# Patient Record
Sex: FEMALE | Race: WHITE | NOT HISPANIC OR LATINO | Employment: FULL TIME | URBAN - METROPOLITAN AREA
[De-identification: names, ages, dates, MRNs, and addresses within clinical notes are randomized per-mention and may not be internally consistent; named-entity substitution may affect disease eponyms.]

---

## 2022-09-07 PROBLEM — N87.0 DYSPLASIA OF CERVIX, LOW GRADE (CIN 1): Status: RESOLVED | Noted: 2022-09-07 | Resolved: 2022-09-07

## 2022-09-07 PROBLEM — R87.810 CERVICAL HIGH RISK HUMAN PAPILLOMAVIRUS (HPV) DNA TEST POSITIVE: Status: ACTIVE | Noted: 2022-09-07

## 2022-09-07 PROBLEM — N87.0 DYSPLASIA OF CERVIX, LOW GRADE (CIN 1): Status: ACTIVE | Noted: 2022-09-07

## 2022-11-16 PROBLEM — N87.1 MODERATE DYSPLASIA OF CERVIX: Chronic | Status: ACTIVE | Noted: 2022-11-16

## 2022-11-16 PROBLEM — D25.2 FIBROIDS, SUBSEROUS: Chronic | Status: ACTIVE | Noted: 2022-11-16

## 2022-11-16 PROBLEM — N87.1 MODERATE DYSPLASIA OF CERVIX: Chronic | Status: RESOLVED | Noted: 2022-11-16 | Resolved: 2022-11-16

## 2022-11-16 PROBLEM — D25.2 FIBROIDS, SUBSEROUS: Chronic | Status: RESOLVED | Noted: 2022-11-16 | Resolved: 2022-11-16

## 2022-11-16 PROBLEM — N92.1 MENOMETRORRHAGIA: Chronic | Status: RESOLVED | Noted: 2022-11-16 | Resolved: 2022-11-16

## 2022-11-16 PROBLEM — Z98.890 HISTORY OF LOOP ELECTRICAL EXCISION PROCEDURE (LEEP): Chronic | Status: ACTIVE | Noted: 2022-11-16

## 2022-11-16 PROBLEM — N92.1 MENOMETRORRHAGIA: Chronic | Status: ACTIVE | Noted: 2022-11-16

## 2023-05-16 ENCOUNTER — OFFICE VISIT (OUTPATIENT)
Dept: URGENT CARE | Facility: CLINIC | Age: 33
End: 2023-05-16
Payer: COMMERCIAL

## 2023-05-16 VITALS
HEART RATE: 79 BPM | TEMPERATURE: 97 F | DIASTOLIC BLOOD PRESSURE: 89 MMHG | WEIGHT: 215 LBS | OXYGEN SATURATION: 98 % | BODY MASS INDEX: 33.74 KG/M2 | SYSTOLIC BLOOD PRESSURE: 141 MMHG | HEIGHT: 67 IN | RESPIRATION RATE: 18 BRPM

## 2023-05-16 DIAGNOSIS — J01.90 ACUTE BACTERIAL RHINOSINUSITIS: Primary | ICD-10-CM

## 2023-05-16 DIAGNOSIS — B96.89 ACUTE BACTERIAL RHINOSINUSITIS: Primary | ICD-10-CM

## 2023-05-16 PROCEDURE — 99203 PR OFFICE/OUTPT VISIT, NEW, LEVL III, 30-44 MIN: ICD-10-PCS | Mod: S$GLB,,,

## 2023-05-16 PROCEDURE — 99203 OFFICE O/P NEW LOW 30 MIN: CPT | Mod: S$GLB,,,

## 2023-05-16 RX ORDER — AMOXICILLIN AND CLAVULANATE POTASSIUM 875; 125 MG/1; MG/1
1 TABLET, FILM COATED ORAL EVERY 12 HOURS
Qty: 14 TABLET | Refills: 0 | Status: SHIPPED | OUTPATIENT
Start: 2023-05-16 | End: 2023-05-23

## 2023-05-16 RX ORDER — GUAIFENESIN 600 MG/1
1200 TABLET, EXTENDED RELEASE ORAL 2 TIMES DAILY
Qty: 40 TABLET | Refills: 0 | Status: SHIPPED | OUTPATIENT
Start: 2023-05-16 | End: 2023-05-26

## 2023-05-16 RX ORDER — PROMETHAZINE HYDROCHLORIDE AND DEXTROMETHORPHAN HYDROBROMIDE 6.25; 15 MG/5ML; MG/5ML
5 SYRUP ORAL EVERY 4 HOURS PRN
Qty: 118 ML | Refills: 0 | Status: SHIPPED | OUTPATIENT
Start: 2023-05-16 | End: 2023-05-23

## 2023-05-16 RX ORDER — TRIAMCINOLONE ACETONIDE 55 UG/1
2 SPRAY, METERED NASAL DAILY
Qty: 6.7 ML | Refills: 0 | Status: SHIPPED | OUTPATIENT
Start: 2023-05-16

## 2023-05-16 NOTE — LETTER
May 16, 2023      [facility]  [address], [address]  [place] [address]  Phone: [phone]  Fax: [phone]       Patient: Iliana Velásquez   YOB: 1990  Date of Visit: 05/16/2023    To Whom It May Concern:    Madiha Velásquez  was at [facility] on 05/16/2023. The patient may return to work/school on 5/17/23 with no restrictions. If you have any questions or concerns, or if I can be of further assistance, please do not hesitate to contact me.    Sincerely,    Maribel Sheets PA-C

## 2023-05-16 NOTE — PROGRESS NOTES
"Subjective:      Patient ID: Iliana Velásquez is a 32 y.o. female.    Vitals:  height is 5' 7" (1.702 m) and weight is 97.5 kg (215 lb). Her oral temperature is 97.1 °F (36.2 °C). Her blood pressure is 141/89 (abnormal) and her pulse is 79. Her respiration is 18 and oxygen saturation is 98%.     Chief Complaint: Cough    C/o coughing, sore throat, nasal congestion x 1 week. She is taking claritin and mucinex with no relief.     Cough  This is a new problem. The current episode started 1 to 4 weeks ago (1 week). The problem has been unchanged. The problem occurs constantly. The cough is Productive of sputum. Associated symptoms include headaches, nasal congestion, postnasal drip and a sore throat. Pertinent negatives include no ear congestion, ear pain, fever, rash or shortness of breath. The symptoms are aggravated by lying down. She has tried OTC cough suppressant (claritin, mucinex) for the symptoms. The treatment provided no relief. Her past medical history is significant for asthma. sports asthma     Constitution: Negative for fever.   HENT:  Positive for congestion, postnasal drip, sinus pressure and sore throat. Negative for ear pain.    Respiratory:  Positive for cough. Negative for shortness of breath.    Gastrointestinal:  Negative for vomiting and diarrhea.   Skin:  Negative for rash.   Neurological:  Positive for headaches.    Objective:     Physical Exam   Constitutional: She is oriented to person, place, and time. She appears well-developed. She is cooperative.  Non-toxic appearance. She does not appear ill. No distress.   HENT:   Head: Normocephalic and atraumatic.   Ears:   Right Ear: Hearing, external ear and ear canal normal. Tympanic membrane is retracted. A middle ear effusion is present.   Left Ear: Hearing, external ear and ear canal normal. A middle ear effusion is present.   Nose: Mucosal edema and congestion present. No rhinorrhea or nasal deformity. No epistaxis. Right sinus exhibits " maxillary sinus tenderness and frontal sinus tenderness. Left sinus exhibits maxillary sinus tenderness and frontal sinus tenderness.   Mouth/Throat: Uvula is midline and mucous membranes are normal. Mucous membranes are moist. No trismus in the jaw. Normal dentition. No uvula swelling. Posterior oropharyngeal erythema present. No oropharyngeal exudate or posterior oropharyngeal edema.   Eyes: Conjunctivae and lids are normal. No scleral icterus.   Neck: Trachea normal and phonation normal. Neck supple. No edema present. No erythema present. No neck rigidity present.   Cardiovascular: Normal rate, regular rhythm, normal heart sounds and normal pulses.   Pulmonary/Chest: Effort normal and breath sounds normal. No respiratory distress. She has no decreased breath sounds. She has no wheezes. She has no rhonchi.   Abdominal: Normal appearance.   Musculoskeletal: Normal range of motion.         General: No deformity. Normal range of motion.   Neurological: She is alert and oriented to person, place, and time. She exhibits normal muscle tone. Coordination normal.   Skin: Skin is warm, dry, intact, not diaphoretic and not pale.   Psychiatric: Her speech is normal and behavior is normal. Judgment and thought content normal.   Nursing note and vitals reviewed.    Assessment:     1. Acute bacterial rhinosinusitis        Plan:       Acute bacterial rhinosinusitis  -     amoxicillin-clavulanate 875-125mg (AUGMENTIN) 875-125 mg per tablet; Take 1 tablet by mouth every 12 (twelve) hours. for 7 days  Dispense: 14 tablet; Refill: 0  -     triamcinolone (NASACORT) 55 mcg nasal inhaler; 2 sprays by Nasal route once daily.  Dispense: 6.7 mL; Refill: 0  -     promethazine-dextromethorphan (PROMETHAZINE-DM) 6.25-15 mg/5 mL Syrp; Take 5 mLs by mouth every 4 (four) hours as needed (cough).  Dispense: 118 mL; Refill: 0  -     guaiFENesin (MUCINEX) 600 mg 12 hr tablet; Take 2 tablets (1,200 mg total) by mouth 2 (two) times daily. for 10  days  Dispense: 40 tablet; Refill: 0        VS stable.  Please drink plenty of fluids. Please get plenty of rest.  Please return here or go to the Emergency Department for any concerns or worsening of condition. Discussed with patient the importance of f/u with their primary care provider. Urged to go to the ER for any worsening signs or symptoms.     Patient Instructions   1.  Take all medications as directed. If you have been prescribed antibiotics, make sure to complete them.   2.  Rest and keep yourself/patient well hydrated. For adults, it is recommended to drink at least 8-10 glasses of water daily.   3.  For patients above 6 months of age who are not allergic to and are not on anticoagulants, you can alternate Tylenol and Motrin every 4-6 hours for fever above 100.4F and/or pain.  For patients less than 6 months of age, allergic to or intolerant to NSAIDS, have gastritis, gastric ulcers, or history of GI bleeds, are pregnant, or are on anticoagulant therapy, you can take Tylenol every 4 hours as needed for fever above 100.4F and/or pain.   4. You should schedule a follow-up appointment with your Primary Care Provider/Pediatrician for recheck in 2-3 days or as directed at this visit.   5.  If your condition fails to improve in a timely manner, you should receive another evaluation by your Primary Care Provider/Pediatrician to discuss your concerns or return to urgent care for a recheck.  If your condition worsens at any time, you should report immediately to your nearest Emergency Department for further evaluation. **You must understand that you have received Urgent Care treatment only and that you may be released before all of your medical problems are known or treated. You, the patient, are responsible to arrange for follow-up care as instructed.

## 2023-11-25 ENCOUNTER — OFFICE VISIT (OUTPATIENT)
Dept: URGENT CARE | Facility: CLINIC | Age: 33
End: 2023-11-25
Payer: COMMERCIAL

## 2023-11-25 VITALS
HEIGHT: 67 IN | HEART RATE: 74 BPM | WEIGHT: 225 LBS | DIASTOLIC BLOOD PRESSURE: 83 MMHG | OXYGEN SATURATION: 96 % | SYSTOLIC BLOOD PRESSURE: 128 MMHG | RESPIRATION RATE: 18 BRPM | TEMPERATURE: 98 F | BODY MASS INDEX: 35.31 KG/M2

## 2023-11-25 DIAGNOSIS — J06.9 UPPER RESPIRATORY TRACT INFECTION, UNSPECIFIED TYPE: ICD-10-CM

## 2023-11-25 DIAGNOSIS — J02.9 SORE THROAT: Primary | ICD-10-CM

## 2023-11-25 LAB
CTP QC/QA: YES
MOLECULAR STREP A: NEGATIVE

## 2023-11-25 PROCEDURE — 87651 STREP A DNA AMP PROBE: CPT | Mod: QW,S$GLB,, | Performed by: NURSE PRACTITIONER

## 2023-11-25 PROCEDURE — 99214 PR OFFICE/OUTPT VISIT, EST, LEVL IV, 30-39 MIN: ICD-10-PCS | Mod: S$GLB,,, | Performed by: NURSE PRACTITIONER

## 2023-11-25 PROCEDURE — 87651 POCT STREP A MOLECULAR: ICD-10-PCS | Mod: QW,S$GLB,, | Performed by: NURSE PRACTITIONER

## 2023-11-25 PROCEDURE — 99214 OFFICE O/P EST MOD 30 MIN: CPT | Mod: S$GLB,,, | Performed by: NURSE PRACTITIONER

## 2023-11-25 NOTE — PROGRESS NOTES
"Subjective:      Patient ID: Iliana Velásquez is a 33 y.o. female.    Vitals:  height is 5' 7" (1.702 m) and weight is 102.1 kg (225 lb). Her oral temperature is 98.3 °F (36.8 °C). Her blood pressure is 128/83 and her pulse is 74. Her respiration is 18 and oxygen saturation is 96%.     Chief Complaint: Sore Throat    Pt is coming in for a sore throat and nasal drip that began yesterday. Pt states she has no other symptoms and was not exposed to and flu, covid or strep. Pt has tried mucinex with no relief.    Sore Throat   This is a new problem. The current episode started yesterday. The problem has been unchanged. Neither side of throat is experiencing more pain than the other. There has been no fever. The pain is at a severity of 3/10. The pain is mild. Associated symptoms include swollen glands and trouble swallowing. Pertinent negatives include no congestion, coughing, diarrhea, ear pain, headaches or hoarse voice. She has had no exposure to mono. Treatments tried: mucinex. The treatment provided no relief.       HENT:  Positive for sore throat and trouble swallowing. Negative for ear pain and congestion.    Respiratory:  Negative for cough.    Gastrointestinal:  Negative for diarrhea.   Neurological:  Negative for headaches.      Objective:     Physical Exam   Constitutional:  Non-toxic appearance. She does not appear ill. No distress. normal  HENT:   Head: Normocephalic.   Ears:   Right Ear: Tympanic membrane normal.   Left Ear: Tympanic membrane normal.   Nose: Rhinorrhea present.   Mouth/Throat: Mucous membranes are moist.   Eyes: Conjunctivae are normal. Pupils are equal, round, and reactive to light. Extraocular movement intact   Cardiovascular: Normal rate.   Pulmonary/Chest: Effort normal and breath sounds normal.   Abdominal: Normal appearance and bowel sounds are normal.   Neurological: no focal deficit. She is alert and at baseline.   Skin: Skin is warm. Capillary refill takes less than 2 seconds. "   Nursing note and vitals reviewed.    Assessment:     1. Sore throat    2. Upper respiratory tract infection, unspecified type      Results for orders placed or performed in visit on 11/25/23   POCT Strep A, Molecular   Result Value Ref Range    Molecular Strep A, POC Negative Negative     Acceptable Yes          Plan:       Sore throat  -     POCT Strep A, Molecular    Upper respiratory tract infection, unspecified type

## 2024-01-03 PROBLEM — N83.202 LEFT OVARIAN CYST: Status: RESOLVED | Noted: 2024-01-03 | Resolved: 2024-01-03

## 2024-01-03 PROBLEM — N80.30 PELVIC PERITONEAL ENDOMETRIOSIS: Status: ACTIVE | Noted: 2024-01-03

## 2024-01-03 PROBLEM — R10.2 PELVIC PAIN: Chronic | Status: RESOLVED | Noted: 2024-01-03 | Resolved: 2024-01-03

## 2024-01-03 PROBLEM — N73.6 PELVIC ADHESIONS: Chronic | Status: RESOLVED | Noted: 2024-01-03 | Resolved: 2024-01-03

## 2024-01-03 PROBLEM — R10.2 PELVIC PAIN: Chronic | Status: ACTIVE | Noted: 2024-01-03

## 2024-01-03 PROBLEM — N83.202 LEFT OVARIAN CYST: Status: ACTIVE | Noted: 2024-01-03

## 2024-01-03 PROBLEM — N73.6 PELVIC ADHESIONS: Chronic | Status: ACTIVE | Noted: 2024-01-03

## 2024-11-21 ENCOUNTER — TELEPHONE (OUTPATIENT)
Dept: TRANSPLANT | Facility: CLINIC | Age: 34
End: 2024-11-21

## 2024-11-21 DIAGNOSIS — Z00.5 TRANSPLANT DONOR EVALUATION: Primary | ICD-10-CM

## 2024-11-21 NOTE — TELEPHONE ENCOUNTER
Completed BREEZE questionnaire reviewed and independent living donor advocate assessment completed. Spoke with patient to arrange preliminary crossmatch. Lab appointment scheduled. All questions were answered and patient verbalized understanding.

## 2024-12-18 ENCOUNTER — TELEPHONE (OUTPATIENT)
Dept: TRANSPLANT | Facility: CLINIC | Age: 34
End: 2024-12-18
Payer: MEDICARE

## 2024-12-18 NOTE — TELEPHONE ENCOUNTER
Patient notified that the results of the crossmatch with her uncle show that they are compatible. Patient states she would like to proceed with the medical evaluation after she has had some weight loss success.  Let her know to call me when she has lost at least 10 pounds, needs approximately 20 pounds weight loss for to be within criteria (per self-reported weight). Patient states she will call us once she has reached her weight loss goal.  All questions answered.

## 2025-01-29 ENCOUNTER — TELEPHONE (OUTPATIENT)
Dept: TRANSPLANT | Facility: CLINIC | Age: 35
End: 2025-01-29
Payer: MEDICARE

## 2025-01-29 DIAGNOSIS — Z00.5 TRANSPLANT DONOR EVALUATION: Primary | ICD-10-CM

## 2025-01-29 NOTE — TELEPHONE ENCOUNTER
Patient notified us that she is within the BMI criteria for living donation and would like to proceed with the medical evaluation. Required testing was discussed including infectious disease and HIV testing. Opportunity provided for questions. Informed that she will be contacted to schedule appointments. All questions were answered and patient verbalized understanding.

## 2025-02-21 ENCOUNTER — TELEPHONE (OUTPATIENT)
Dept: TRANSPLANT | Facility: CLINIC | Age: 35
End: 2025-02-21
Payer: MEDICARE

## 2025-02-24 ENCOUNTER — TELEPHONE (OUTPATIENT)
Dept: TRANSPLANT | Facility: CLINIC | Age: 35
End: 2025-02-24
Payer: MEDICARE

## 2025-02-24 NOTE — TELEPHONE ENCOUNTER
Returned voice message from patient.  Confirmed appointments on 2/27/24 and reviewed 24 hour urine collection instructions.  All questions answered.

## 2025-02-27 ENCOUNTER — OFFICE VISIT (OUTPATIENT)
Dept: TRANSPLANT | Facility: CLINIC | Age: 35
End: 2025-02-27
Payer: MEDICARE

## 2025-02-27 ENCOUNTER — HOSPITAL ENCOUNTER (OUTPATIENT)
Dept: RADIOLOGY | Facility: HOSPITAL | Age: 35
Discharge: HOME | End: 2025-02-27
Attending: NURSE PRACTITIONER
Payer: MEDICARE

## 2025-02-27 ENCOUNTER — HOSPITAL ENCOUNTER (OUTPATIENT)
Dept: CARDIOLOGY | Facility: CLINIC | Age: 35
Discharge: HOME | End: 2025-02-27
Payer: MEDICARE

## 2025-02-27 VITALS
HEART RATE: 72 BPM | WEIGHT: 210.31 LBS | TEMPERATURE: 97 F | DIASTOLIC BLOOD PRESSURE: 82 MMHG | SYSTOLIC BLOOD PRESSURE: 116 MMHG | HEIGHT: 67 IN | OXYGEN SATURATION: 99 % | BODY MASS INDEX: 33.01 KG/M2 | RESPIRATION RATE: 16 BRPM

## 2025-02-27 DIAGNOSIS — Z00.5 TRANSPLANT DONOR EVALUATION: ICD-10-CM

## 2025-02-27 DIAGNOSIS — Z00.5 TRANSPLANT DONOR EVALUATION: Primary | ICD-10-CM

## 2025-02-27 DIAGNOSIS — Z86.69 HISTORY OF MIGRAINE: ICD-10-CM

## 2025-02-27 LAB
OHS QRS DURATION: 84 MS
OHS QTC CALCULATION: 449 MS

## 2025-02-27 PROCEDURE — 99999 PR PBB SHADOW E&M-EST. PATIENT-LVL IV: CPT | Mod: PBBFAC,,,

## 2025-02-27 PROCEDURE — 97802 MEDICAL NUTRITION INDIV IN: CPT | Mod: PBBFAC

## 2025-02-27 PROCEDURE — 25500020 PHARM REV CODE 255: Mod: TXP | Performed by: NURSE PRACTITIONER

## 2025-02-27 PROCEDURE — 71046 X-RAY EXAM CHEST 2 VIEWS: CPT | Mod: TC,TXP

## 2025-02-27 PROCEDURE — 74174 CTA ABD&PLVS W/CONTRAST: CPT | Mod: TC,TXP

## 2025-02-27 PROCEDURE — 99999PBSHW PR PBB SHADOW TECHNICAL ONLY FILED TO HB: Mod: PBBFAC,,,

## 2025-02-27 PROCEDURE — 99205 OFFICE O/P NEW HI 60 MIN: CPT | Mod: S$PBB,,, | Performed by: STUDENT IN AN ORGANIZED HEALTH CARE EDUCATION/TRAINING PROGRAM

## 2025-02-27 PROCEDURE — 99214 OFFICE O/P EST MOD 30 MIN: CPT | Mod: PBBFAC,25

## 2025-02-27 RX ADMIN — IOHEXOL 100 ML: 350 INJECTION, SOLUTION INTRAVENOUS at 03:02

## 2025-02-27 NOTE — PROGRESS NOTES
"DONOR TEACHING NOTE    Met with Iliana Velásquez today in clinic to review living donor education.        Topics covered included:  Kidney donation is done voluntarily and of the donor's free will.  Process can stop at any time.   Better success rates than cadaveric donation, shorter waiting time for the recipient: less than the 3-5 year wait on the list, more time to prepare: tests/surgery can be planned  Laboratory studies of blood and urine.  Health exams: records of GYN/pap/mammogram (females); evaluation by transplant team and a psychiatrist (if indicated); diagnostic Tests: CXR, EKG, TB skin test, 24-hour urine collection, renal scan, CT of abdomen to assess kidney anatomy, and stress test (if indicated)  Team will review workup for approval.  Copy of the approved criteria for donation given to patient.  Surgeon will decide which kidney will be donated.   Benefits of laparoscopic nephrectomy: less pain, shorter hospital stay, a shorter recovery period.  Risks discussed: bleeding, deep vein thrombosis, pulmonary embolus, the need for re-operation.  Your operation may be converted to an "open" procedure if the surgeon feels it is medically necessary.  To recovery room, transfer to TSU, if space allows or semi-private room.  Kendall catheter/IV, average hospital stay is 1 day.  At discharge the cost of any prescriptions is the donor's responsibility.  Post-operative visit 4 weeks after surgery or prior to returning to work, unless a complication arises and you need to be seen sooner.  Off of work for about 3 to 6 weeks, no driving for at least the first 3 weeks.  General surgical complications: infection, allergic reaction, and anesthesia.   Long life considerations of living with one kidney: risk of HTN and kidney failure   Following up with PCP 6 months after donation then yearly thereafter to monitor kidney function.  This should include weight, labs, urinalysis, and a blood pressure check.  We are required to " report your progress to UNOS at 6 months, 1 year, and 2 years post-donation.     Written educational material provided. Informed consent reviewed and signed. All questions were answered and patient verbalized understanding.     Patient is a suitable candidate for living kidney donation.

## 2025-02-27 NOTE — PROGRESS NOTES
Transplant Surgery Kidney Donor Evaluation     Referring Physician:      Subjective:     Chief Complaint: Iliana Velásquez is a 34 y.o. year old female who presents today wishing to be evaluated as a potential living related donor for her uncle.    History of Present Illness:  Iliana NEWELL reports being here without coercion, payment, guilt, or other alternative motives other than wanting to help someone with kidney disease.    Surgical history:  hysterectomy (path benign), L removal of R ovary and L ovarian cyst and endometriosis fulguration, tonsils    External provider notes reviewed: Yes    Review of Systems   Constitutional:  Negative for activity change, appetite change, fatigue, fever and unexpected weight change.   Respiratory:  Negative for cough, chest tightness, shortness of breath and wheezing.    Cardiovascular:  Negative for chest pain.   Gastrointestinal:  Negative for abdominal distention, abdominal pain, diarrhea, nausea and vomiting.   Genitourinary:  Negative for difficulty urinating.     Objective:   Physical Exam  Constitutional:       Appearance: Normal appearance.   HENT:      Head: Normocephalic and atraumatic.   Eyes:      General: No scleral icterus.     Pupils: Pupils are equal, round, and reactive to light.   Cardiovascular:      Rate and Rhythm: Normal rate.   Pulmonary:      Effort: Pulmonary effort is normal. No respiratory distress.   Abdominal:      Palpations: Abdomen is soft. There is no mass.      Tenderness: There is no abdominal tenderness.      Hernia: No hernia is present.      Comments: Tatoos; well healed port sites.  Prior hysterectomy reportedly extracted through the umbilicus, which has a very small scar   Skin:     General: Skin is warm and dry.   Neurological:      Mental Status: She is alert.       ABO type: O POS    Diagnostics:  The following labs have been reviewed: CBC  BMP  PT  INR  The following radiology images have been independently reviewed and interpreted: none  yet - T pending    Diagnoses:  1. Transplant donor evaluation             Transplant Surgery - Candidacy   Assessment/Plan:     Donor Candidacy: Based on information available thus far, Iliana NEWELL is an excellent candidate for living kidney donation.    Does have endometriosis reportedly on the bladder with prior pelvic surgery    Additional testing to be completed according to Written Order Guideline for Living Kidney Donor (LD) Evaluation (LDK-02).    Patient advised that it is recommended that all transplant candidates, and their close contacts and household members receive Covid vaccination.    Interpretation of tests and discussion of patient management involves all members of the multidisciplinary transplant team.  Rufino Amezcua MD, PhD       Education: I discussed with the patient the requirements for donation including the compatibility of blood and tissue typing, healthy by physical examination and workup, as well as the desire to donate.  We discussed the risks related to surgery including the risks related to anesthesia, bleeding, infection, inability for surgery to be performed laparoscopically, risks of reoperation as well as the risks of death.  We discussed the length of hospitalization, return to work times, as well as follow-up post-donation.    I also discussed the slight possibility that due to problems with the recipient operation, the transplant might not be able to be completed after the organ was already removed. If such a situation should arise, the donor prefers that the organ be transplanted into a suitable third-party recipient.    I discussed the possibility that living donor sometimes encounter problems obtaining health insurance or could have higher premium despite ongoing efforts of transplant professionals to educate insurance companies on this issue.    I discussed with Iliana NEWELL that donation is a voluntary activity and reiterated it should be done willingly and for altruistic reasons  only.  I reviewed that no payment should be made for donating.  I also discussed that coercion, guilt, pressure, or feelings of obligation are not appropriate reasons to donate.  The option to withdraw at any time was emphasized.  Iliana NEWELL was reminded that a medical opt out can be given to protect her confidentiality, and no member of the transplant team will discuss specifics of her health or medical/social history with anyone else without permission.  The need for lifelong routine medical follow-up for optimal health, including routine health maintenance was reviewed.    Additionally, I discussed the need for our program to be able to contact living donors for UNOS reporting purposes for a minimum of 2 years.  Failure of our center to be able to provide such information could jeopardize our ability to continue to offer living donor transplants.  Iliana NEWELL voices understanding and agrees to this follow-up.    I discussed the UNOS requirement for centers to report donor status for a minimum of two years. Iliana NEWELL understands that failure to comply with requirement could have adverse consequences for our transplant program and agrees to cooperate with all our required follow-up.    I reviewed with Iliana NEWELL available lab results and other diagnostics from the evaluation process.    Coronavirus disease (COVID-19) caused by severe acute respiratory virus coronavirus 2 (SARS-C0V 2) is associated with increased mortality in solid organ transplant recipients (SOT) compared to non-transplant patients. Vaccine responses to vaccination are depressed against SARS-CoV2 compared to normal individuals but improve with third vaccination doses. Vaccination prior to SOT provides both the best opportunity for transplant candidates to develop protective immunity and to reduce the risk of serious COVID19 infections post transplantation. Organ transplant candidates at [facility] will be required  to receive SARS-CoV-2 vaccination prior to being listed with a an active status, whenever possible. Exceptions will be made for disability related reasons or for sincerely held Rastafarian beliefs.

## 2025-02-27 NOTE — PROGRESS NOTES
PHARM.D. PRE-TRANSPLANT DONOR NOTE:    This patient's medication therapy was evaluated as part of her pre-transplant evaluation.    The following pharmacologic concerns were noted: Patient currently taking etodolac and tizanidine (avg 2-3 doses per week).      Current Medications[1]      I am available for consultation and can be contacted, as needed by the other members of the Kidney Transplant team.          [1]   Current Outpatient Medications   Medication Sig Dispense Refill    etodolac (LODINE) 400 MG tablet Take 400 mg by mouth 2 (two) times daily as needed.      tiZANidine (ZANAFLEX) 4 MG tablet Take 4 mg by mouth nightly as needed.      valACYclovir (VALTREX) 1000 MG tablet Take 1,000 mg by mouth every evening.       No current facility-administered medications for this visit.

## 2025-02-27 NOTE — PROGRESS NOTES
Kidney Transplant Donor Evaluation    Subjective:       CC:  Initial evaluation of kidney donor candidacy.    HPI:  Ms. Velásquez is a 34 y.o. year old White female who has presented to be evaluated as a potential living related donor for her uncle.  Ms. Velásquez reports being here without coercion, payment, guilt or other alternative motives other than wanting to help someone with kidney disease.    BMI today 33.13. Has been working on weight loss for donation and already down 20 lbs. Plans to continue.    History of UTIs as a child, none recently. History of endometriosis and abnormal pap, had right ovary removal and partial hysterectomy. She is on OCP to help prevent ovarian cyst formation. Migraine for which she rarely takes etodolac.     She is a  for [employer]. Very active on the job.     Patient denies any history of coronary artery disease, stroke, seizure disorder, chronic obstructive pulmonary disease, liver disease, kidney stones, gallstones, deep venous thrombosis, pulmonary embolism, recurrent urinary tract infections or malignancies.    Current Medications[1]  Family History   Problem Relation Name Age of Onset    Hypertension Mother      Heart disease Mother      Heart disease Father      Hypertension Father      COPD Father       Past Medical History:   Diagnosis Date    Asthma     ACTIVITY INDUCED    Herpes simplex     Hx of migraines     Pelvic pain 11/2023    Wears glasses      Past Surgical History:   Procedure Laterality Date    HYSTERECTOMY  11/2022    LAPAROSCOPIC LYSIS OF ADHESIONS N/A 1/3/2024    Procedure: LYSIS, ADHESIONS, LAPAROSCOPIC;  Surgeon: Gris Priest MD;  Location: [facility] OR;  Service: OB/GYN;  Laterality: N/A;    LAPAROSCOPIC OOPHORECTOMY Right 1/3/2024    Procedure: OOPHORECTOMY, LAPAROSCOPIC, RIGHT;  Surgeon: Gris Priest MD;  Location: [facility] OR;  Service: OB/GYN;  Laterality: Right;  7am per Karen    LAPAROSCOPIC SALPINGECTOMY N/A  "11/16/2022    Procedure: SALPINGECTOMY, LAPAROSCOPIC, BILATERAL;  Surgeon: Gris Priest MD;  Location: [facility] OR;  Service: OB/GYN;  Laterality: N/A;  POSSIBLE OCTAVIA LAP OOPHERECTOMY    LAPAROSCOPIC TOTAL HYSTERECTOMY N/A 11/16/2022    Procedure: HYSTERECTOMY, TOTAL, LAPAROSCOPIC;  Surgeon: Gris Priest MD;  Location: [facility] OR;  Service: OB/GYN;  Laterality: N/A;    LOOP ELECTROSURGICAL EXCISION PROCEDURE (LEEP) N/A 09/07/2022    Procedure: LEEP (LOOP ELECTROSURGICAL EXCISION PROCEDURE);  Surgeon: Gris Priest MD;  Location: [facility] OR;  Service: OB/GYN;  Laterality: N/A;    SURGICAL REMOVAL OF CYST OF OVARY Left 1/3/2024    Procedure: EXCISION, CYST, OVARY;  Surgeon: Gris Priest MD;  Location: [facility] OR;  Service: OB/GYN;  Laterality: Left;    SURGICAL REMOVAL OF ENDOMETRIOSIS N/A 1/3/2024    Procedure: DESTRUCTION, ENDOMETRIOSIS;  Surgeon: Gris Priest MD;  Location: [facility] OR;  Service: OB/GYN;  Laterality: N/A;    TONSILLECTOMY       Social History[2]    Review of Systems   Constitutional:  Negative for activity change and fever.   Eyes:  Negative for visual disturbance.   Respiratory:  Negative for shortness of breath.    Cardiovascular:  Negative for chest pain and leg swelling.   Gastrointestinal:  Negative for constipation, diarrhea and nausea.   Genitourinary:  Negative for difficulty urinating, frequency and hematuria.   Musculoskeletal:  Negative for arthralgias and myalgias.   Skin:  Negative for wound.   Neurological:  Negative for weakness and numbness.   Psychiatric/Behavioral:  Negative for sleep disturbance. The patient is not nervous/anxious.        Objective:  /84 (BP Location: Right arm, Patient Position: Sitting)   Pulse 79   Temp 97.3 °F (36.3 °C) (Temporal)   Resp 16   Ht 5' 6.81" (1.697 m)   Wt 95.4 kg (210 lb 5.1 oz)   LMP 10/01/2022 (Approximate)   SpO2 99%   BMI 33.13 kg/m²      Physical Exam  Vitals and nursing note " reviewed.   Constitutional:       Appearance: Normal appearance.   Cardiovascular:      Rate and Rhythm: Normal rate and regular rhythm.      Heart sounds: Normal heart sounds.   Pulmonary:      Effort: Pulmonary effort is normal.      Breath sounds: Normal breath sounds.   Abdominal:      General: There is no distension.   Musculoskeletal:         General: Normal range of motion.   Skin:     General: Skin is warm and dry.   Neurological:      Mental Status: She is alert.         Labs:  2/27/2025: Creatinine 0.8 mg/dL (Ref range: 0.5 - 1.4 mg/dL); Creatinine, Urine 94.0 mg/dL (Ref range: 15.0 - 325.0 mg/dL); Creatinine, Urine 135.0 mg/dL (Ref range: 15.0 - 325.0 mg/dL); BUN 11 mg/dL (Ref range: 6 - 20 mg/dL)  2/27/2025: Nitrite, UA Negative (Ref range: Negative)  ABO type: O POS    Assessment:     1. Transplant donor evaluation    2. History of migraine    3. BMI 33.0-33.9,adult      Plan:   Donor Candidacy:   Based on the given information, Ms. Velásquez appears to be a suitable candidate for kidney donation.  A final recommendation will be made by the selection committee after reviewing her complete workup.    Jane Galaviz NP       Counseling:   I discussed with Ms. Velásquez that donation is voluntary and reiterated it should be done willingly and for altruistic reasons only.  I reviewed that no payment should be received for donating.  I also discussed that coercion, guilt, pressure, or feelings of obligation are not appropriate reasons to donate.  The option to withdraw at any time was emphasized.  Ms. Velásquez was reminded that a medical opt out can be given to protect her confidentiality, and no member of the transplant team will discuss specifics of her health or medical/social history with anyone else without permission.  The need for lifelong routine medical follow-up for optimal health, including routine health maintenance was reviewed.    We also discussed the long term risks associated with kidney donation.   I told the patient that her GFR should return to within 75-80% of pre-donation level within six months of donation.  I informed the patient that there is a small risk of developing albuminuria and hypertension following donor nephrectomy.  I also informed the patient that based on current literature, the risk of developing end-stage renal disease following donor nephrectomy is similar to the general population.    Patient advised that it is recommended that all transplant candidates, and their close contacts and household members receive Covid vaccination.    I reviewed with Ms. Didier available lab results and other diagnostics from the evaluation process    Additional Counseling:   The patient was counseled on the need for regular follow-up with a primary care physician for blood pressure and cholesterol screening.  The importance of age appropriate health screening was also emphasized.    Follow-up: PRN    Altogether, 30 minutes of this encounter were spent on counseling, which was greater than 50% of the total visit time.       [1]   Current Outpatient Medications   Medication Sig Dispense Refill    etodolac (LODINE) 400 MG tablet Take 400 mg by mouth 2 (two) times daily as needed.      tiZANidine (ZANAFLEX) 4 MG tablet Take 4 mg by mouth nightly as needed.      valACYclovir (VALTREX) 1000 MG tablet Take 1,000 mg by mouth every evening.       No current facility-administered medications for this visit.   [2]   Social History  Tobacco Use    Smoking status: Never     Passive exposure: Past    Smokeless tobacco: Never   Substance Use Topics    Alcohol use: Never    Drug use: Never

## 2025-02-27 NOTE — PROGRESS NOTES
REASON FOR VISIT:   Potential Kidney Donor; BMI >30.0    PAST MEDICAL HX:   No significant past medical history     WT: 95.4 kg (210 lb)  BMI: 33.13 kg/m2     LABS:   Reviewed     NUTRITION HX:   Pt and caregiver present. Pt reports good appetite on a regular diet with no N/V/D/C.  lbs at this beginning of this year. Intentional wt loss through diet (no fast food, meal prepping) and exercise (softball 2-4x/wk, works with horses).    INTERVENTION/EDUCATION:  Reviewed Weight Loss Tips handout (general tips, food preparation, snacking and eating and emotions).   - 205 lbs, BMI 32.3   - 185 lb, BMI 29.1    Encouraged physical activity daily, regular exercise as tolerated, stay mobile.    Patient voiced understanding of education & goals. Contact information was provided & will f/u as needed at clinic visits.     Consultation Time: 15 minutes

## 2025-02-27 NOTE — PROGRESS NOTES
TRANSPLANT DONOR PSYCHOSOCIAL ASSESSMENT    Iliana Velásquez  [address] [place] [address]  Telephone Information:   Mobile [phone]     Home [phone] (home)  Work  There is no work phone number on file.  E-mail  [email or web address]    PHS Increased Risk Behavioral Questionnaire reviewed by : Yes   addressed any PHS increased risk behaviors or concerns. Resources and education provided as appropriate.    Sex: female  YOB: 1990  Age: 34 y.o.    Encounter Date: 2025  [place] Citizen: yes  Primary Language: English   Needed: no    Potential Recipient: Francisco J Velásquez  Clinic Number: [id number]  Donor's Relationship to Patient: donor is recipient's niece    Emergency Contact:  Barbara Velásquez, 64 yo mother, [place], does drive/own car, retired. [phone]    Family/Social Support:   Number of dependents/: pt denies  Marital history: single, never   Other family dynamics: Father is . Supportive mother and extended family living nearby.    Household Composition:  Barbara Velásquez, 64 yo mother, [place], does drive/own car, retired. [phone]  Germán Sweeney, 51 yo maternal uncle, [place], does drive/own car. [phone]    Do you and your caregivers have access to reliable transportation? yes  PRIMARY CAREGIVER: Barbara Velásquez, mother, will be primary caregiver, phone number [phone]     provided in-depth information to patient and caregiver regarding pre- and post-transplant caregiver role.   strongly encourages patient and caregiver to have concrete plan regarding post-transplant care giving, including back-up caregiver(s) to ensure care giving needs are met as needed.    Patient and Caregiver states understanding all aspects of caregiver role/commitment and is able/willing/committed to being caregiver to the fullest extent necessary.    Patient and Caregiver verbalizes understanding of the education  provided today and caregiver responsibilities.         remains available. Patient and Caregiver agree to contact  in a timely manner if concerns arise.      Able to take time off work without financial concerns: yes.     Additional Significant Others who will Assist with Transplant:  Germán Sweeney, 49 yo maternal uncle, [place], does drive/own car. [phone]    Living Will: no  Healthcare Power of : no  Advance Directives on file: <no information> per medical record.  Verbally reviewed LW/HCPA information.   provided patient with copy of LW/HCPA documents and provided education on completion of forms.    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 Pt reports having employee benefits STD/LTD and PTO/sick time. Pt reports employer is very supportive and will be paying patient for donor surgery time off.  Fundraising and NLDAC information provided to patient.  Patient verbalizes understanding.   remains available.    Spouse/Significant Other Employment: pt reports is not     Insurance: see potential recipient's insurance for donor coverage.    Does the donor have health insurance? Yes, UMR through her employer.    Earned Monthly Income: $3,000    Patient verbalizes clear understanding that patient may experience difficulty obtaining and/or be denied insurance coverages post-surgery.  This includes and is not limited to disability insurance, life insurance, health insurance, burial insurance, long term care insurance, and other insurances.  Patient also reports understanding that future health concerns related to or unrelated to transplantation may not be covered by patient's insurance.   Resources and information provided and reviewed.      Patient provides verbal permission to release any necessary information to outside resources for patient care and discharge planning.  Resources and information provided and reviewed.      Infusion Service: patient utilizing? no  Home Health: patient utilizing? no  DME: no  ADLS:  independent    Adherence:   Pt reports high medical compliance with appointments and instructions within last 3 months.   Adherence education and counseling provided    Per History Section:  Past Medical History:   Diagnosis Date    Asthma     ACTIVITY INDUCED    Herpes simplex     Hx of migraines     Pelvic pain 11/2023    Wears glasses      Social History     Tobacco Use    Smoking status: Never     Passive exposure: Past    Smokeless tobacco: Never   Substance Use Topics    Alcohol use: Never     Social History     Substance and Sexual Activity   Drug Use Never     Social History     Substance and Sexual Activity   Sexual Activity Yes    Partners: Male    Birth control/protection: See Surgical Hx       Per Today's Psychosocial:  Tobacco: none, patient denies any use.  Alcohol: none, patient denies any use.  Illicit Drugs/Non-prescribed Medications: none, patient denies any use.    Patient and Caregiver states clear understanding of the potential impact of substance use as it relates to donor candidacy and is agreeable to random substance screening.  Substance abstinence/cessation counseling, education and resources provided and reviewed.     Arrests/DWI/Treatment/Rehab: patient denies    Psychiatric History:    Mental Health: Pt denies any mental health history and denies any mental health needs at this time.  Psychiatrist/Counselor: pt denies  Medications:  pt denies  Suicide/Homicide Issues: pt denies   Safety at home: Pt reports living in safe home environment with no abuse at this time.    Donation Knowledge/Expectations: Patient states having clear understanding and realistic  expectations regarding the potential risks and potential benefits of organ transplantation and organ donation and agrees to discuss with health care team members and support system members, as well as to utilize available resources and express questions and/or concerns in order to further facilitate the patients informed decision-making.  Resources and information provided and reviewed.     Decision-making Process: Pt reports it was her idea to be evaluated as kidney donor to her uncle Francisco J Velásquez because he has been on dialysis and his first kidney donor situation fell apart due to potential kidney donor medical situation.  Patient states understanding that transplant and donation are not a guarantee that the donated organ will function. Patient states understanding of kidney treatment options available for kidney patients, psychosocial aspects surrounding organ donation and transplantation as well as recovery.  Patient also states clear understanding that patient may choose to not donate at any time prior to surgery taking place, and that patient confidentiality is protected.  Patient reports expected compliance with health care regime and states understanding of importance of compliance.  Educational information provided.    Patient reports having a clear understanding  that risks and benefits may be involved with organ transplantation and with organ donation and  of the treatment options available to a potential transplant recipient. Patient has an understanding there are short and long-term medical and psychosocial risks of living donation for both the donor and recipient. Patient reports clear understanding that psychosocial risk factors which may affect patient, including but not limited to feelings of depression, generalized anxiety, anxiety regarding dependence on others, post traumatic stress disorder, feelings of guilt and other emotional and/or mental concerns, and/or exacerbation of existing mental  health concerns.     Detailed resources and education provided and discussed. Patient agrees to access appropriate resources in a timely manner as needed and to communicate concerns appropriately.      Feelings or Concerns: Pt reports hope for successful kidney organ transplant so that her uncle Francisco J will be off dialysis and have healthier life. Patient denies feelings of coercion, pressure or obligation to donate. Patient states that patient is not receiving any compensation for organ donation. Patient reports motivation to pursue organ donation at this time.     Patient reports having clear and realistic expectations and understanding of the many psychosocial aspects involved with being a living organ donor, including but not limited to costs, compliance, medications, lab work, procedures, appointments, financial planning, preparedness, timely and appropriate communication of concerns, abstinence from non-prescribed drugs or substances, importance of adherence to and follow-through with all health care team recommendations, participation in health care and treatment planning, utilization of resources and follow-through, mental health counseling as needed and/or recommended, and the patient and caregiver responsibilities.  Patient states having a clear understanding of possible difficulty obtaining or possibility of being denied insurance coverage post-surgery.  This includes and is not limited to disability insurance, life insurance, health insurance, burial insurance, long-term care insurance and other insurances.  Educational and resource information provided and reviewed.  Patient also reports understanding that future health concerns related to or unrelated to organ donation may not be covered by patients insurance.    Coping: Identify Patient & Caregiver Strategies to [place]:   1. In the past, coping with major surgery and/or related stress - family support; enjoys playing softball   2. Currently &  Pre-transplant - family support; enjoys playing softball   3. At the time of organ donation surgery - family support   4. During post-Organ donation & Recovery Period - family support    Interview Behavior: Iliana NEWELL presents as alert and oriented x 4, pleasant, good eye contact, well groomed, recall good, concentration/judgement good, average intelligence, calm, communicative, cooperative, and asking and answering questions appropriately.      Pt's mother Barbara in session only for transplant caregiver/transportation plan confirmation. There was a SW candidate shadowing this transplant SW today, otherwise, pt was seen alone for rest of session.    Suitability for Donation: Iliana Velásquez presents as low risk candidate for donation at this time.    Recommendations/Additional Comments: Pt reports employer is supportive of living donation and will take steps for patient to be paid while away for transplant surgery and recovery.    SW recommends that pt conduct fundraising to assist pt with pay for cost of medications, food, gas, and other transplant related needs.  SW recommends that pt remain aware of potential mental health concerns and contact the team if any concerns arise.  SW recommends that pt remain abstinent from tobacco, ETOH, and drug use.  SW supports pt's continued adherence. SW remains available to answer any questions or concerns that arise as the pt moves through the transplant process.      Final determination of transplant candidacy will be reviewed by the selection committee.      Samia OLIVA LCSW

## 2025-02-28 ENCOUNTER — TELEPHONE (OUTPATIENT)
Dept: TRANSPLANT | Facility: CLINIC | Age: 35
End: 2025-02-28
Payer: MEDICARE

## 2025-02-28 ENCOUNTER — RESULTS FOLLOW-UP (OUTPATIENT)
Dept: TRANSPLANT | Facility: CLINIC | Age: 35
End: 2025-02-28

## 2025-02-28 NOTE — TELEPHONE ENCOUNTER
Spoke to patient and reviewed test results.  Let patient know that her CT scan showed she had bilateral kidney stones so she would not be able to donate.  All questions answered.